# Patient Record
Sex: FEMALE | Race: OTHER
[De-identification: names, ages, dates, MRNs, and addresses within clinical notes are randomized per-mention and may not be internally consistent; named-entity substitution may affect disease eponyms.]

---

## 2018-03-19 ENCOUNTER — HOSPITAL ENCOUNTER (EMERGENCY)
Dept: HOSPITAL 62 - ER | Age: 28
Discharge: HOME | End: 2018-03-19
Payer: OTHER GOVERNMENT

## 2018-03-19 VITALS — DIASTOLIC BLOOD PRESSURE: 83 MMHG | SYSTOLIC BLOOD PRESSURE: 122 MMHG
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
## 2018-03-19 DIAGNOSIS — F17.210: ICD-10-CM

## 2018-03-19 DIAGNOSIS — S71.111A: Primary | ICD-10-CM
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
## 2018-03-19 DIAGNOSIS — Z71.6: ICD-10-CM
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
## 2018-03-19 PROCEDURE — 99283 EMERGENCY DEPT VISIT LOW MDM: CPT

## 2018-03-19 PROCEDURE — 90471 IMMUNIZATION ADMIN: CPT

## 2018-03-19 PROCEDURE — 90715 TDAP VACCINE 7 YRS/> IM: CPT

## 2018-03-19 PROCEDURE — 12001 RPR S/N/AX/GEN/TRNK 2.5CM/<: CPT

## 2018-03-19 PROCEDURE — 99406 BEHAV CHNG SMOKING 3-10 MIN: CPT
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
